# Patient Record
Sex: MALE | Race: ASIAN | NOT HISPANIC OR LATINO
[De-identification: names, ages, dates, MRNs, and addresses within clinical notes are randomized per-mention and may not be internally consistent; named-entity substitution may affect disease eponyms.]

---

## 2022-10-17 PROBLEM — Z00.00 ENCOUNTER FOR PREVENTIVE HEALTH EXAMINATION: Status: ACTIVE | Noted: 2022-10-17

## 2022-11-04 ENCOUNTER — APPOINTMENT (OUTPATIENT)
Dept: COLORECTAL SURGERY | Facility: CLINIC | Age: 47
End: 2022-11-04

## 2022-11-04 VITALS
SYSTOLIC BLOOD PRESSURE: 124 MMHG | HEART RATE: 91 BPM | BODY MASS INDEX: 19.69 KG/M2 | TEMPERATURE: 97.9 F | WEIGHT: 136 LBS | HEIGHT: 69.5 IN | DIASTOLIC BLOOD PRESSURE: 83 MMHG

## 2022-11-04 DIAGNOSIS — Z86.19 PERSONAL HISTORY OF OTHER INFECTIOUS AND PARASITIC DISEASES: ICD-10-CM

## 2022-11-04 DIAGNOSIS — Z78.9 OTHER SPECIFIED HEALTH STATUS: ICD-10-CM

## 2022-11-04 DIAGNOSIS — Z80.0 FAMILY HISTORY OF MALIGNANT NEOPLASM OF DIGESTIVE ORGANS: ICD-10-CM

## 2022-11-04 PROCEDURE — 99203 OFFICE O/P NEW LOW 30 MIN: CPT

## 2022-11-04 NOTE — PHYSICAL EXAM
[Fistula] : a fistula [Normal] : was normal [None] : there was no rectal mass  [de-identified] : Scarring along the right anterior anal margin with identified anal fistula opening

## 2022-11-04 NOTE — HISTORY OF PRESENT ILLNESS
[FreeTextEntry1] : 46 y/o M presents for evaluation of perianal abscess referred by Dr. Nadeem Saeed. \par \par PMH: Denies\par PSH: Denies\par FMH colorectal: Father colon Ca dx 91 y/o\par Last colonoscopy: 11/2/22, NL\par Denies ASA/NSAIDs in last 7 days.\par \par He was seen by GI Dr. Saeed 10/15/22 c/o of anal pain for the last year with chronic anal abscess incision and debridement of fistula August 2022. Was prescribed abx and lidocaine 3% external cream during visit. \par \par Today he reports that he noticed abscess in April when he was still living in [place], he had excised and drained in August, moved to [place] in October, established care with Dr. Saeed who sent him for MRI Pelvis (10/25/22)revealing transsphincteric perianal fistula from the 6'oclock position of the anus fistula, penetration of the right external sphincter with superior tract terminating bn the obturators internus muscle and prostate gland and inferior tract to the perineum, no focal ischioanal abscess collection. Per GI note 10/2022 he was started on abx, and lidocaine 3 %, however pt admits to self medicating with abx for 2 weeks. Symptoms: Felt pain at the area 1 year ago but did not address until  April of this year p/w worsening pain, bleeding, drainage, stool leaking through abscess. Denies fever, chills. He admits significant improvement in abscess, denies further drainage, bleeding, pain. \par \par BH: April constipation, straining. BM now 1-2 times daily, sits on toilet for extended period of time to avoid straining, soft and formed.\par \par Previous interventions: hot shower for pain relief. Denies any other interventions. \par \par Admits can improve intake of dietary fiber and water (2 bottles daily).\par Denies to use of stool softeners or fiber supplements\par \par \par \par \par \par \par \par

## 2022-11-04 NOTE — ASSESSMENT
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  Given the complexity of this fistula I recommend that we proceed with an exam under anesthesia with drainage/seton insertion.  Followed by 2 to 3 months of consolidation I suspect that he would be an excellent candidate in the future for an advancement flap repair.\par \par The risks and befits of the treatment plan were reviewed and outlined with the patient. The patient understands the associated risks of the involved treatment and wishes to proceed. All questions were answered and explained.\par \par

## 2022-11-16 ENCOUNTER — TRANSCRIPTION ENCOUNTER (OUTPATIENT)
Age: 47
End: 2022-11-16

## 2022-11-17 ENCOUNTER — RESULT REVIEW (OUTPATIENT)
Age: 47
End: 2022-11-17

## 2022-11-17 ENCOUNTER — TRANSCRIPTION ENCOUNTER (OUTPATIENT)
Age: 47
End: 2022-11-17

## 2022-11-17 ENCOUNTER — OUTPATIENT (OUTPATIENT)
Dept: OUTPATIENT SERVICES | Facility: HOSPITAL | Age: 47
LOS: 1 days | Discharge: HOME | End: 2022-11-17

## 2022-11-17 ENCOUNTER — APPOINTMENT (OUTPATIENT)
Dept: COLORECTAL SURGERY | Facility: HOSPITAL | Age: 47
End: 2022-11-17

## 2022-11-17 VITALS
HEART RATE: 68 BPM | SYSTOLIC BLOOD PRESSURE: 128 MMHG | HEIGHT: 68 IN | TEMPERATURE: 97 F | DIASTOLIC BLOOD PRESSURE: 86 MMHG | RESPIRATION RATE: 14 BRPM | OXYGEN SATURATION: 98 %

## 2022-11-17 VITALS
HEART RATE: 76 BPM | DIASTOLIC BLOOD PRESSURE: 93 MMHG | OXYGEN SATURATION: 98 % | TEMPERATURE: 98 F | RESPIRATION RATE: 16 BRPM | SYSTOLIC BLOOD PRESSURE: 139 MMHG

## 2022-11-17 PROCEDURE — 88304 TISSUE EXAM BY PATHOLOGIST: CPT | Mod: 26

## 2022-11-17 PROCEDURE — 45020 DRAINAGE OF RECTAL ABSCESS: CPT | Mod: GC

## 2022-11-17 RX ORDER — OXYCODONE HYDROCHLORIDE 5 MG/1
1 TABLET ORAL
Qty: 5 | Refills: 0
Start: 2022-11-17

## 2022-11-17 RX ORDER — ACETAMINOPHEN 500 MG
1000 TABLET ORAL ONCE
Refills: 0 | Status: DISCONTINUED | OUTPATIENT
Start: 2022-11-17 | End: 2022-11-17

## 2022-11-17 RX ORDER — SODIUM CHLORIDE 9 MG/ML
500 INJECTION, SOLUTION INTRAVENOUS
Refills: 0 | Status: DISCONTINUED | OUTPATIENT
Start: 2022-11-17 | End: 2022-11-17

## 2022-11-17 RX ORDER — OXYCODONE HYDROCHLORIDE 5 MG/1
5 TABLET ORAL ONCE
Refills: 0 | Status: DISCONTINUED | OUTPATIENT
Start: 2022-11-17 | End: 2022-11-17

## 2022-11-17 RX ORDER — ONDANSETRON 8 MG/1
4 TABLET, FILM COATED ORAL ONCE
Refills: 0 | Status: DISCONTINUED | OUTPATIENT
Start: 2022-11-17 | End: 2022-11-17

## 2022-11-17 NOTE — ASU PREOP CHECKLIST - SELECT TESTS ORDERED
COVID-19 You can access the FollowMyHealth Patient Portal offered by [facility] by registering at the following website: [email or web address]. By joining [facility]’s FollowMyHealth portal, you will also be able to view your health information using other applications (apps) compatible with our system.

## 2022-11-17 NOTE — ASU DISCHARGE PLAN (ADULT/PEDIATRIC) - CARE PROVIDER_API CALL
Gabriele Morrow)  ColonRectal Surgery; Surgery  [facility], [address]nd [place] [address]  Phone: [phone]  Fax: [phone]  Follow Up Time: 2 weeks

## 2022-11-17 NOTE — PROVIDER CONTACT NOTE (MEDICATION) - ACTION/TREATMENT ORDERED:
MD Pimentel stated he would send post op medications there. PT and pt's escort aware, and to call MD Office if any issues

## 2022-11-17 NOTE — PRE-ANESTHESIA EVALUATION ADULT - NSANTHOSAYNRD_GEN_A_CORE
No. SANDEEP screening performed.  STOP BANG Legend: 0-2 = LOW Risk; 3-4 = INTERMEDIATE Risk; 5-8 = HIGH Risk
No. SANDEEP screening performed.  STOP BANG Legend: 0-2 = LOW Risk; 3-4 = INTERMEDIATE Risk; 5-8 = HIGH Risk

## 2022-11-17 NOTE — ASU DISCHARGE PLAN (ADULT/PEDIATRIC) - NS MD DC FALL RISK RISK
For information on Fall & Injury Prevention, visit: [email or web address] OR  [email or web address] OR  https://www.cdc.gov/steadi/patient.html

## 2022-11-29 LAB — SURGICAL PATHOLOGY STUDY: SIGNIFICANT CHANGE UP

## 2022-12-19 ENCOUNTER — APPOINTMENT (OUTPATIENT)
Dept: COLORECTAL SURGERY | Facility: CLINIC | Age: 47
End: 2022-12-19

## 2022-12-19 VITALS
TEMPERATURE: 97.1 F | DIASTOLIC BLOOD PRESSURE: 85 MMHG | WEIGHT: 139 LBS | BODY MASS INDEX: 20.13 KG/M2 | HEART RATE: 116 BPM | SYSTOLIC BLOOD PRESSURE: 121 MMHG | HEIGHT: 69.5 IN

## 2022-12-19 PROCEDURE — 99214 OFFICE O/P EST MOD 30 MIN: CPT | Mod: 24

## 2022-12-19 NOTE — ASSESSMENT
[FreeTextEntry1] : I have discussed the patient that his complex anal fistula disease–Trans sphincteric would be best treated surgically.  Recommend return follow-up in 2 months to see Dr. Dunne for consideration of possible advancement flap repair.  Risk, benefits and alternatives outlined.  All questions answered.\par \par A chinese  was utilized for the entire visit . All questions were addressed.\par

## 2022-12-19 NOTE — PHYSICAL EXAM
[Fistula] : a fistula [Normal] : was normal [None] : there was no rectal mass  [de-identified] : Scarring along the right anterior anal margin with identified anal fistula opening

## 2022-12-19 NOTE — HISTORY OF PRESENT ILLNESS
[FreeTextEntry1] : 46 yo M presents for post op follow up of anal fistula, s/p \par Known to JAYSON Saeed\par Last colonoscopy 11/2/22 w/ Dr. Barrett Powell, internal non bleeding hemorrhoids noted, exam otherwise normal. No specimens collected\par \par h/o chronic anal abscess, incision and debridement of fistula August 2022 while living in [place]. Seen by JAYSON Saeed who treated w/ abx and lidocaine and referred for MRI pelvis (10/25/22) which noted transsphincteric perianal fistula from the 6 o'clock position of the anus fistula, penetration of the right external sphincter with superior tract terminating between the obturators internus muscle and prostate gland and inferior tract to the perineum, no focal ischioanal abscess collection.\par \par Last seen 11/4/22 for initial consultation, exam noted a fistula and scarring along the right anterior anal margin with identified anal fistula opening. The sphincter tone was normal. There was no rectal tenderness present. There was no rectal mass. \par \par s/p EUA, seton placement 11/17/22\par Surgical Pathology Report - Auth (Verified)\par \par Specimen(s) Submitted\par 1  Anal fistula tract\par \par Final Diagnosis\par Anal fistula tract:\par - Anal tissue with granulation tissue, acute and chronic inflammation\par and fibrosis\par \par Verified by: Jane Montanez MD\par \par

## 2022-12-19 NOTE — HISTORY OF PRESENT ILLNESS
[FreeTextEntry1] : 48 yo M presents for post op follow up of anal fistula, s/p \par Known to JAYSON Saeed\par Last colonoscopy 11/2/22 w/ Dr. Barrett Powell, internal non bleeding hemorrhoids noted, exam otherwise normal. No specimens collected\par \par h/o chronic anal abscess, incision and debridement of fistula August 2022 while living in [place]. Seen by JAYSON Saeed who treated w/ abx and lidocaine and referred for MRI pelvis (10/25/22) which noted transsphincteric perianal fistula from the 6 o'clock position of the anus fistula, penetration of the right external sphincter with superior tract terminating between the obturators internus muscle and prostate gland and inferior tract to the perineum, no focal ischioanal abscess collection.\par \par Last seen 11/4/22 for initial consultation, exam noted a fistula and scarring along the right anterior anal margin with identified anal fistula opening. The sphincter tone was normal. There was no rectal tenderness present. There was no rectal mass. \par \par s/p EUA, seton placement 11/17/22\par Surgical Pathology Report - Auth (Verified)\par \par Specimen(s) Submitted\par 1  Anal fistula tract\par \par Final Diagnosis\par Anal fistula tract:\par - Anal tissue with granulation tissue, acute and chronic inflammation\par and fibrosis\par \par Verified by: Jane Montanez MD\par \par

## 2022-12-19 NOTE — PHYSICAL EXAM
[Fistula] : a fistula [Normal] : was normal [None] : there was no rectal mass  [de-identified] : Scarring along the right anterior anal margin with identified anal fistula opening

## 2023-02-23 ENCOUNTER — APPOINTMENT (OUTPATIENT)
Dept: COLORECTAL SURGERY | Facility: CLINIC | Age: 48
End: 2023-02-23
Payer: MEDICAID

## 2023-02-23 VITALS
TEMPERATURE: 95 F | WEIGHT: 140 LBS | DIASTOLIC BLOOD PRESSURE: 91 MMHG | SYSTOLIC BLOOD PRESSURE: 129 MMHG | HEIGHT: 69.5 IN | HEART RATE: 76 BPM | BODY MASS INDEX: 20.27 KG/M2

## 2023-02-23 PROCEDURE — 46600 DIAGNOSTIC ANOSCOPY SPX: CPT

## 2023-02-23 NOTE — PHYSICAL EXAM
[FreeTextEntry1] : Medical assistant present for duration of physical examination\par \par General no acute distress, alert and oriented\par Psych responding appropriately to questions\par Nonlabored breathing\par Ambulating without assistance\par Skin normal color and pigment\par \par Anorectal Exam:\par Inspection scarring right anterior perianal skin about 3cm from anal verge, center of scar with 1cm area of induration and central punctum, no expressible discharge or drainage\par GENARO nontender, no masses palpated, no blood on gloved finger, no fluctuance\par \par Procedure: Anoscopy\par \par Pre procedure Diagnosis: anal fistula\par Post procedure Diagnosis: anal fistula\par Anesthesia: none\par Estimated blood loss: none\par Specimen: none\par Complications: none\par \par Consent obtained. Anoscopy was performed by passing a lighted anoscope with lubricant jelly into the anal canal and the entire anal mucosal surface was inspected. Findings included no fissure, moderate internal hemorrhoids, no visible mucosal changes or openings\par \par Patient tolerated examination and procedure well.\par \par \par

## 2023-02-23 NOTE — ASSESSMENT
[FreeTextEntry1] : Mandarin-speaking staff present for duration of patient encounter.\par Referred by Dr Morrow for complex anal fistula. H/o surgery 11/17/22 with Dr Morrow - KARINA, I&D, unable to cannulate internal opening of fistula per review of operative note. \par \par Recommend exam under anesthesia, possible seton placement.\par The nature of the procedure as well as risks and benefits were reviewed with the patient. Risk/benefits/alternatives discussed at length, including but not limited to pain, bleeding, infection, swelling, recurrence of symptoms, need for future surgery, scarring, and risk of alteration of bowel habits, which may be temporary or permanent.  The preoperative, intraoperative, and postoperative management were discussed with the patient in detail.  Informed consent was obtained. Ambulatory surgery instructions were given to patient.\par \par We discussed further recommendations or attempts at anal fistula repair would be pending assessment of fistula at time of EUA. We discussed multiple staged surgeries may be indicated to address a complex fistula.\par All questions were answered, patient expressed understanding, and would like to proceed with the proposed plan.\par

## 2023-02-23 NOTE — HISTORY OF PRESENT ILLNESS
[FreeTextEntry1] : 47 yo M presents for follow up complex transsphincteric fistula\par Previously seen by Dr. Morrow\par \par H/o chronic anal abscess I &D, with underlying fistula in 8/2022 while living in [place]. Later patient moved to [place] and established care w/ Gi Dr. Saeed 10/15/20, who sent pt for MRI of pelvis 10/25/22 c/w transsphincteric perianal fistula from 6 O'clock of the anus fistula penetration of the right external sphincter with superior tract terminating on the obturators internus muscle and prostate gland and inferior tract to the perineum, no focal ischioanal abscess collection.Per Gi note, pt prescribed abx and lidocaine 3% external cream during visit. \par \par Seen by Dr. Morrow for initial consultation 11/4/22, fistula and scarring along the right anterior anal margin with identified anal fistula opening, sphincter tone was normal. \par \par s/p EUA with I &D and drainage of perirectal abscess 11/17/22\par \par Surgical Pathology Report - Auth (11/29/22)\par Specimen(s) Submitted\par 1 Anal fistula tract\par Final Diagnosis\par Anal fistula tract:\par - Anal tissue with granulation tissue, acute and chronic inflammation\par and fibrosis\par Verified by: Jane Montanez MD\par \par Last seen by Dr. Morrow 12/19/22 for post op follow up. Per note, surgical procedure was c/w a complex transsphincteric fistula w/ an extensive fabiana horseshoe fistula tract. The internal opening was visualized, but unable to be cannulated through the external orifice w/ fistula probe given the circuitous and complex tract.\par Exam noted scarring along the right anterior anal margin with identified anal fistula opening. The sphincter tone was normal. There was no rectal tenderness present. There was no rectal mass. \par \par Recommended to return in 2 months to see Dr. Dunne for surgical evaluation\par \par Returns today c/o daily discharge, small amount on a pad.\par Reports an area of swelling, small, on and off every few days, for past few months. \par No fevers.\par BMs are daily, soft \par Denies BRBPR\par Denies pain\par Good appetite. \par \par Had colonoscopy 11/2/2022 - exam was otherwise normal to cecum, no specimens collection. \par \par Currently does not have any drains in place.

## 2023-03-23 ENCOUNTER — TRANSCRIPTION ENCOUNTER (OUTPATIENT)
Age: 48
End: 2023-03-23

## 2023-03-23 NOTE — ASU PATIENT PROFILE, ADULT - NS PREOP UNDERSTANDS INFO
NPO solids after midnight 3/22/23, stop clears after 4:30 am on DOS, bring insurance card and photo ID, Escort to bring photo ID, address and phone # reviewed with pt via Mandarin translater # [phone]./yes

## 2023-03-24 ENCOUNTER — TRANSCRIPTION ENCOUNTER (OUTPATIENT)
Age: 48
End: 2023-03-24

## 2023-03-24 ENCOUNTER — OUTPATIENT (OUTPATIENT)
Dept: OUTPATIENT SERVICES | Facility: HOSPITAL | Age: 48
LOS: 1 days | Discharge: HOME | End: 2023-03-24
Payer: MEDICAID

## 2023-03-24 ENCOUNTER — APPOINTMENT (OUTPATIENT)
Dept: COLORECTAL SURGERY | Facility: CLINIC | Age: 48
End: 2023-03-24

## 2023-03-24 VITALS
TEMPERATURE: 99 F | RESPIRATION RATE: 15 BRPM | HEART RATE: 85 BPM | SYSTOLIC BLOOD PRESSURE: 127 MMHG | OXYGEN SATURATION: 100 % | DIASTOLIC BLOOD PRESSURE: 85 MMHG

## 2023-03-24 VITALS
HEART RATE: 64 BPM | OXYGEN SATURATION: 98 % | HEIGHT: 68.5 IN | DIASTOLIC BLOOD PRESSURE: 88 MMHG | WEIGHT: 136.25 LBS | SYSTOLIC BLOOD PRESSURE: 127 MMHG | RESPIRATION RATE: 14 BRPM | TEMPERATURE: 97 F

## 2023-03-24 DIAGNOSIS — G89.18 OTHER ACUTE POSTPROCEDURAL PAIN: ICD-10-CM

## 2023-03-24 DIAGNOSIS — Z98.890 OTHER SPECIFIED POSTPROCEDURAL STATES: Chronic | ICD-10-CM

## 2023-03-24 PROCEDURE — 46020 PLACEMENT OF SETON: CPT | Mod: GC

## 2023-03-24 DEVICE — SURGICEL 4 X 8": Type: IMPLANTABLE DEVICE | Status: FUNCTIONAL

## 2023-03-24 RX ORDER — DOCUSATE SODIUM 100 MG
2 CAPSULE ORAL
Qty: 0 | Refills: 0 | DISCHARGE

## 2023-03-24 RX ORDER — OXYCODONE AND ACETAMINOPHEN 5; 325 MG/1; MG/1
1 TABLET ORAL EVERY 4 HOURS
Refills: 0 | Status: DISCONTINUED | OUTPATIENT
Start: 2023-03-24 | End: 2023-03-24

## 2023-03-24 RX ORDER — OXYCODONE AND ACETAMINOPHEN 5; 325 MG/1; MG/1
2 TABLET ORAL EVERY 6 HOURS
Refills: 0 | Status: DISCONTINUED | OUTPATIENT
Start: 2023-03-24 | End: 2023-03-24

## 2023-03-24 RX ORDER — OXYCODONE AND ACETAMINOPHEN 5; 325 MG/1; MG/1
5-325 TABLET ORAL
Qty: 12 | Refills: 0 | Status: ACTIVE | COMMUNITY
Start: 2023-03-24 | End: 1900-01-01

## 2023-03-24 RX ORDER — DOCUSATE SODIUM 100 MG/1
100 CAPSULE ORAL 3 TIMES DAILY
Qty: 90 | Refills: 1 | Status: ACTIVE | COMMUNITY
Start: 2023-03-24 | End: 1900-01-01

## 2023-03-24 RX ORDER — ONDANSETRON 8 MG/1
4 TABLET, FILM COATED ORAL ONCE
Refills: 0 | Status: DISCONTINUED | OUTPATIENT
Start: 2023-03-24 | End: 2023-03-24

## 2023-03-24 RX ORDER — ACETAMINOPHEN 500 MG
650 TABLET ORAL ONCE
Refills: 0 | Status: DISCONTINUED | OUTPATIENT
Start: 2023-03-24 | End: 2023-03-24

## 2023-03-24 NOTE — ASU DISCHARGE PLAN (ADULT/PEDIATRIC) - CARE PROVIDER_API CALL
Miryam Dunne)  ColonRectal Surgery; Surgery  [address] [facility], 2nd Floor  [place], [place] [address]  Phone: [phone]  Fax: [phone]  Follow Up Time:

## 2023-03-24 NOTE — ASU DISCHARGE PLAN (ADULT/PEDIATRIC) - NS MD DC FALL RISK RISK
For information on Fall & Injury Prevention, visit: [email or web address] OR  [email or web address] OR  https://www.cdc.gov/steadi/patient.html

## 2023-04-26 PROBLEM — K60.3 ANAL FISTULA: Chronic | Status: ACTIVE | Noted: 2023-03-23

## 2023-05-04 ENCOUNTER — APPOINTMENT (OUTPATIENT)
Dept: COLORECTAL SURGERY | Facility: CLINIC | Age: 48
End: 2023-05-04
Payer: MEDICAID

## 2023-05-04 VITALS
SYSTOLIC BLOOD PRESSURE: 112 MMHG | TEMPERATURE: 97.3 F | WEIGHT: 144 LBS | BODY MASS INDEX: 20.85 KG/M2 | DIASTOLIC BLOOD PRESSURE: 80 MMHG | HEART RATE: 73 BPM | HEIGHT: 69.5 IN

## 2023-05-04 PROCEDURE — 99214 OFFICE O/P EST MOD 30 MIN: CPT

## 2023-05-04 NOTE — PHYSICAL EXAM
Recommended observation. [FreeTextEntry1] : Medical assistant present for duration of physical examination\par \par General no acute distress, alert and oriented\par Psych responding appropriately to questions\par Nonlabored breathing\par Ambulating without assistance\par Skin normal color and pigment\par \par Anorectal Exam:\par Inspection scarring right anterior perianal skin about 3cm from anal verge, center of scar contains seton extending towards posterior anal canal, scant expressible discharge from external opening\par \par Patient tolerated examination well.\par \par \par

## 2023-05-04 NOTE — HISTORY OF PRESENT ILLNESS
[FreeTextEntry1] : 49 y/o M presents for post operative evaluation of complex transsphincteric fistula \par \par H/o chronic anal abscess I &D, with underlying fistula in 8/2022 while living in [place]. Established care w/GI Dr. Saeed (10/15/20) MRI obtained 10/25/20 c/w transsphincteric perianal fistula from 6 O'clock of the anus fistula penetration of the right external sphincter with superior tract terminating on the obturators internus muscle and prostate gland and inferior tract to the perineum, no focal ischioanal abscess collection.Per Gi note, pt prescribed abx and lidocaine 3% external cream during visit. \par \par Previously seen by CRS Dr. Morrow, for initial consultation 11/4/22. \par \par S/p EUA with I & D and drainage of perirectal abscess 11/17/22. 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 The internal opening was visualized, but unable to be cannulated thought the external orifice w/ fistula probe given the circuitous and complex tract. \par \par Patient advised to our office for surgical evaluation. Seen 2/23/23, scarring right anterior perianal skin about 3 cm from anal verge, center of scar with 1 cm area of induration and central punctum, no expressible discharge or drainage. Moderate internal hemorrhoids appreciated on anoscopy. \par \par Patient now s/p EUA and seton placement on 3/24/2023\par \par Doing well since procedure.\par Still having drainage, but less than prior. On and off. \par No pain. \par No fevers.\par \par BMs are once daily. Used stool softeners after surgery. \par \par Stiz baths 1-2x per day.

## 2023-05-04 NOTE — ASSESSMENT
[FreeTextEntry1] : Mandarin-speaking staff present for duration of patient encounter.\par Complex anal fistula, s/p seton placement. Possible role for multiple stage procedures discussed to repair fistula.\par Recommend patient consider EUA, subcutaneous fistulotomy, possible seton, possible ligation of fistula tract (LIFT) procedure.\par The nature of the procedure as well as risks and benefits were reviewed with the patient. Risks discussed included but were not limited to pain, bleeding, infection, recurrence of symptoms, need for future surgery, and risk of alteration of bowel habits, such as seepage, fecal urgency and/or fecal (gas, liquid or solid) incontinence discussed, which may be temporary or permanent. \par Failure rates of intervention discussed.\par The preoperative, intraoperative, and postoperative management were discussed with the patient in detail. All questions were answered. The patient is in agreement with the proposed surgery and we will proceed with surgical planning.\par

## 2023-05-25 ENCOUNTER — TRANSCRIPTION ENCOUNTER (OUTPATIENT)
Age: 48
End: 2023-05-25

## 2023-05-25 RX ORDER — ACETAMINOPHEN 500 MG
1000 TABLET ORAL ONCE
Refills: 0 | Status: DISCONTINUED | OUTPATIENT
Start: 2023-05-26 | End: 2023-05-26

## 2023-05-25 RX ORDER — SODIUM CHLORIDE 9 MG/ML
1000 INJECTION, SOLUTION INTRAVENOUS
Refills: 0 | Status: DISCONTINUED | OUTPATIENT
Start: 2023-05-26 | End: 2023-05-26

## 2023-05-25 RX ORDER — FENTANYL CITRATE 50 UG/ML
25 INJECTION INTRAVENOUS
Refills: 0 | Status: DISCONTINUED | OUTPATIENT
Start: 2023-05-26 | End: 2023-05-26

## 2023-05-25 RX ORDER — ONDANSETRON 8 MG/1
8 TABLET, FILM COATED ORAL ONCE
Refills: 0 | Status: DISCONTINUED | OUTPATIENT
Start: 2023-05-26 | End: 2023-05-26

## 2023-05-25 NOTE — ASU PATIENT PROFILE, ADULT - NS PREOP UNDERSTANDS INFO
As per MD no solid food after 7:00pm tonight, water allowed before midnight; patient to bring photo ID/Insurance card; dress in comfortable clothes; no jewelries/contact lens/valuables; no smoking/alcohol/recreational drug use today; escort to come with photo ID; address and callback number was given/yes

## 2023-05-25 NOTE — PRE-ANESTHESIA EVALUATION ADULT - NSANTHOSAYNRD_GEN_A_CORE
No. SANDEEP screening performed.  STOP BANG Legend: 0-2 = LOW Risk; 3-4 = INTERMEDIATE Risk; 5-8 = HIGH Risk

## 2023-05-25 NOTE — ASU PATIENT PROFILE, ADULT - INTERNATIONAL TRAVEL
Patient is currently on losartan 25 mg daily and metoprolol succinate 50 mg daily. He states he measured his BP last months and reported SBP under 130s and DBP under 80.   His initial BP on arrival was 146/89 which improved to 141/83. The patient describes himself as nervous when his BP is taken.  Our recommendation is to continue current management but instructed the patient to closely monitor BP (measure 2 to 3 times/day) and lead a BP log. He was instructed to send the log to us after a few weeks so we can evaluate and adjust his blood pressure medications if necessary.  
The patient is overall doing well and has no active issues or concerns.  We recommended AAA ultrasound screening due to his significant smoking history. He agreed. We will notify him of the results.  Immunizations, including COVID-19 up to date.  
No

## 2023-05-26 ENCOUNTER — OUTPATIENT (OUTPATIENT)
Dept: OUTPATIENT SERVICES | Facility: HOSPITAL | Age: 48
LOS: 1 days | Discharge: HOME | End: 2023-05-26
Payer: MEDICAID

## 2023-05-26 ENCOUNTER — TRANSCRIPTION ENCOUNTER (OUTPATIENT)
Age: 48
End: 2023-05-26

## 2023-05-26 ENCOUNTER — RESULT REVIEW (OUTPATIENT)
Age: 48
End: 2023-05-26

## 2023-05-26 ENCOUNTER — APPOINTMENT (OUTPATIENT)
Dept: COLORECTAL SURGERY | Facility: CLINIC | Age: 48
End: 2023-05-26

## 2023-05-26 VITALS
SYSTOLIC BLOOD PRESSURE: 137 MMHG | HEART RATE: 96 BPM | DIASTOLIC BLOOD PRESSURE: 83 MMHG | OXYGEN SATURATION: 100 % | RESPIRATION RATE: 16 BRPM | TEMPERATURE: 98 F

## 2023-05-26 VITALS
OXYGEN SATURATION: 97 % | DIASTOLIC BLOOD PRESSURE: 92 MMHG | TEMPERATURE: 98 F | HEIGHT: 68 IN | RESPIRATION RATE: 14 BRPM | HEART RATE: 85 BPM | SYSTOLIC BLOOD PRESSURE: 126 MMHG | WEIGHT: 138.89 LBS

## 2023-05-26 DIAGNOSIS — Z98.890 OTHER SPECIFIED POSTPROCEDURAL STATES: Chronic | ICD-10-CM

## 2023-05-26 PROCEDURE — 88304 TISSUE EXAM BY PATHOLOGIST: CPT | Mod: 26

## 2023-05-26 PROCEDURE — 46275 REMOVE ANAL FIST INTER: CPT | Mod: GC

## 2023-05-26 RX ORDER — DOCUSATE SODIUM 100 MG/1
100 CAPSULE ORAL 3 TIMES DAILY
Qty: 90 | Refills: 1 | Status: ACTIVE | COMMUNITY
Start: 2023-05-26 | End: 1900-01-01

## 2023-05-26 RX ORDER — AMOXICILLIN AND CLAVULANATE POTASSIUM 875; 125 MG/1; MG/1
875-125 TABLET, COATED ORAL
Qty: 14 | Refills: 0 | Status: ACTIVE | COMMUNITY
Start: 2023-05-26 | End: 1900-01-01

## 2023-05-26 RX ORDER — OXYCODONE AND ACETAMINOPHEN 5; 325 MG/1; MG/1
5-325 TABLET ORAL
Qty: 18 | Refills: 0 | Status: ACTIVE | COMMUNITY
Start: 2023-05-26 | End: 1900-01-01

## 2023-05-26 RX ADMIN — SODIUM CHLORIDE 100 MILLILITER(S): 9 INJECTION, SOLUTION INTRAVENOUS at 10:09

## 2023-05-26 RX ADMIN — SODIUM CHLORIDE 100 MILLILITER(S): 9 INJECTION, SOLUTION INTRAVENOUS at 11:15

## 2023-05-26 NOTE — ASU DISCHARGE PLAN (ADULT/PEDIATRIC) - ASU DC SPECIAL INSTRUCTIONSFT
[id number] Please follow up with Dr. Dunne In one month. Call her office to schedule an appointment: [phone].     Sitz bathes are to help relieve discomfort in your perineum. The perineum is the area between the vagina and rectum on a female and the area between the scrotum and  rectum on a male. The sitz bath also helps in cleansing and promotes healing.     You have been prescribed Colace, Percocet, and Augmentin please take as prescribed. Please complete your full course of antibiotics.    General Discharge Instructions:  Please resume all regular home medications unless specifically advised not to take a particular medication. Also, please take any new medications as prescribed.  Please get plenty of rest, continue to ambulate several times per day, and drink adequate amounts of fluids. Avoid lifting weights greater than 5-10 lbs until you follow-up with your surgeon, who will instruct you further regarding activity restrictions.  Avoid driving or operating heavy machinery while taking pain medications.  Please follow-up with your surgeon and Primary Care Provider (PCP) as advised.  Incision Care:  *Please call your doctor or nurse practitioner if you have increased pain, swelling, redness, or drainage from the incision site.  *Avoid swimming and baths until your follow-up appointment.  *You may shower, and wash surgical incisions with a mild soap and warm water. Gently pat the area dry.    Warning Signs:  Please call your doctor or nurse practitioner if you experience the following:  *You experience new chest pain, pressure, squeezing or tightness.  *New or worsening cough, shortness of breath, or wheeze.  *If you are vomiting and cannot keep down fluids or your medications.  *You are getting dehydrated due to continued vomiting, diarrhea, or other reasons. Signs of dehydration include dry mouth, rapid heartbeat, or feeling dizzy or faint when standing.  *You see blood or dark/black material when you vomit or have a bowel movement.  *You experience burning when you urinate, have blood in your urine, or experience a discharge.  *Your pain is not improving within 8-12 hours or is not gone within 24 hours. Call or return immediately if your pain is getting worse, changes location, or moves to your chest or back.  *You have shaking chills, or fever greater than 101.5 degrees Fahrenheit or 38 degrees Celsius.  *Any change in your symptoms, or any new symptoms that concern you.

## 2023-05-26 NOTE — BRIEF OPERATIVE NOTE - OPERATION/FINDINGS
Patient placed in prone jorge knife position. Local anal block and transsphincteric block performed.  GENARO performed with notable chronic stricture. Prior seton followed and fistula unroofed using electrocautery. Prior external orifice debrided and specimens collected. Seton removed and tract probed from external to internal orifice. Fistula tract ligated with 16x UR6 and 4x 3-0 vicryl sutures. Hydrogen peroxide injected without notable continued leak from the internal opening. Anoderm reapproximated with vicryl 3-0 x2. Surgical site copiously irrigated and hemostasis confirmed. Patient placed in prone jorge knife position. Perianal block performed.  GENARO performed with notable chronic scarring. Prior seton followed and subcutaneous portion of fistula unroofed using electrocautery. Prior external orifice curetted and specimens collected. Seton removed and tract probed from external to internal orifice. Fistula tract ligated and closed using 2-0 vicryl sutures. Hydrogen peroxide injected without notable passage through the closed internal opening. Anoderm reapproximated with vicryl 3-0 x2. Surgical site copiously irrigated and hemostasis confirmed.

## 2023-05-26 NOTE — ASU DISCHARGE PLAN (ADULT/PEDIATRIC) - CARE PROVIDER_API CALL
Miryam Dunne)  ColonRectal Surgery; Surgery  [address] [facility], 2nd Floor  [place], [place] [address]  Phone: [phone]  Fax: [phone]  Follow Up Time: 1 month

## 2023-05-26 NOTE — ASU DISCHARGE PLAN (ADULT/PEDIATRIC) - NS MD DC FALL RISK RISK
For information on Fall & Injury Prevention, visit: [email or web address] OR  [email or web address] OR  https://www.cdc.gov/steadi/patient.html

## 2023-06-07 LAB — SURGICAL PATHOLOGY STUDY: SIGNIFICANT CHANGE UP

## 2023-06-22 ENCOUNTER — APPOINTMENT (OUTPATIENT)
Dept: COLORECTAL SURGERY | Facility: CLINIC | Age: 48
End: 2023-06-22
Payer: MEDICAID

## 2023-06-22 VITALS
TEMPERATURE: 97.9 F | DIASTOLIC BLOOD PRESSURE: 90 MMHG | SYSTOLIC BLOOD PRESSURE: 131 MMHG | BODY MASS INDEX: 20.27 KG/M2 | WEIGHT: 140 LBS | HEART RATE: 66 BPM | HEIGHT: 69.5 IN

## 2023-06-22 PROCEDURE — 99024 POSTOP FOLLOW-UP VISIT: CPT

## 2023-06-22 NOTE — PHYSICAL EXAM
[FreeTextEntry1] : Medical assistant present for duration of physical examination\par \par General no acute distress, alert and oriented\par Psych responding appropriately to questions, in good spirits\par Nonlabored breathing\par Ambulating without assistance\par Skin normal color and pigment\par \par Anorectal Exam:\par Inspection well healing wound right perianal region, no fluctuance or drainage, no cellulitis\par GENARO nontender, no flutuance\par \par Patient tolerated examination well.\par \par \par

## 2023-06-22 NOTE — ASSESSMENT
[FreeTextEntry1] : Recovering well, patient reassured\par Continue high fiber diet, adequate oral hydration, and sitz baths.\par Avoid constipation and straining\par F/u in 1 month or sooner as needed\par All questions were answered, patient expressed understanding, and is agreeable to this plan.\par Mandarin-speaking staff present for duration of patient encounter.\par \par

## 2023-06-22 NOTE — HISTORY OF PRESENT ILLNESS
[FreeTextEntry1] : 49 yo Mandarin speaking M presents in second post operative evaluation of complex transsphincteric fistula\par \par H/o chronic anal abscess I &D, with underlying fistula in 8/2022 while living in [place]. Established care w/GI Dr. Saeed (10/15/20) MRI obtained 10/25/20 c/w transsphincteric perianal fistula from 6 O'clock of the anus fistula penetration of the right external sphincter with superior tract terminating on the obturators internus muscle and prostate gland and inferior tract to the perineum, no focal ischioanal abscess collection.Per Gi note, pt prescribed abx and lidocaine 3% external cream during visit. \par \par Previously seen by CRS Dr. Morrow, for initial consultation 11/4/22. \par \par S/p EUA with I & D and drainage of perirectal abscess 11/17/22. 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 The internal opening was visualized, but unable to be cannulated thought the external orifice w/ fistula probe given the circuitous and complex tract. \par \par Patient advised to our office for surgical evaluation. Seen 2/23/23, scarring right anterior perianal skin about 3 cm from anal verge, center of scar with 1 cm area of induration and central punctum, no expressible discharge or drainage. Moderate internal hemorrhoids appreciated on anoscopy. \par \par S/p EUA and seton placement on 3/24/2023, Now s/p EUA with subcutaneous fistulotomy and repair of anal fistula with LIFT 5/26/23\par \par Collected Date/Time: 5/26/2023 08:40 EDT\par Surgical Pathology Report - Auth (Verified)\par \par Specimen(s) Submitted\par 1  Anus fistula\par \par Final Diagnosis\par 1.  Anal fistula:\par -   Granulation tissue with marked acute and chronic inflammation and edema\par Verified by: Jane Montanez MD\par \par Pt doing well, admits to pain during initial week of recovery that resolved. As of two days ago, he felt slight needle like sensation when sitting or walking, denies at present.\par \par h/o wetness/discharge during recovery which stopped two days ago. He continues to wear gauze\par \par Moving bowels regularly, denies bleeding, fecal streaking or incontinence\par Takes daily shower and takes sitz bath twice a day\par \par Denies ASA/NSAID use

## 2023-07-20 ENCOUNTER — APPOINTMENT (OUTPATIENT)
Dept: COLORECTAL SURGERY | Facility: CLINIC | Age: 48
End: 2023-07-20

## 2023-07-27 ENCOUNTER — APPOINTMENT (OUTPATIENT)
Dept: COLORECTAL SURGERY | Facility: CLINIC | Age: 48
End: 2023-07-27
Payer: MEDICAID

## 2023-07-27 VITALS
WEIGHT: 142 LBS | HEART RATE: 86 BPM | DIASTOLIC BLOOD PRESSURE: 75 MMHG | SYSTOLIC BLOOD PRESSURE: 118 MMHG | HEIGHT: 69.5 IN | BODY MASS INDEX: 20.56 KG/M2 | TEMPERATURE: 97.9 F

## 2023-07-27 DIAGNOSIS — K60.3 ANAL FISTULA: ICD-10-CM

## 2023-07-27 PROCEDURE — 99024 POSTOP FOLLOW-UP VISIT: CPT

## 2023-07-27 NOTE — ASSESSMENT
[FreeTextEntry1] : Mandarin-speaking staff present for duration of patient encounter.\par Continue stool softeners as needed.\par Discussed topical barrier agents to skin as need for irritation. \par F/u as needed. \par All questions were answered, patient expressed understanding, and is agreeable to this plan.\par

## 2023-07-27 NOTE — HISTORY OF PRESENT ILLNESS
[FreeTextEntry1] : 47 yo Mandarin speaking M presents for f/u \par \par H/o complex transsphincteric fistula, S/p EUA and seton placement on 3/24/2023, Now s/p EUA with subcutaneous fistulotomy and repair of anal fistula with LIFT 5/26/23\par \par H/o chronic anal abscess I &D, with underlying fistula in 8/2022 while living in [place]. Established care w/GI Dr. Saeed (10/15/20) MRI obtained 10/25/20 c/w transsphincteric perianal fistula from 6 O'clock of the anus fistula penetration of the right external sphincter with superior tract terminating on the obturators internus muscle and prostate gland and inferior tract to the perineum, no focal ischioanal abscess collection.Per Gi note, pt prescribed abx and lidocaine 3% external cream during visit. \par \par Previously seen by CRS Dr. Morrow, for initial consultation 11/4/22. \par \par S/p EUA with I & D and drainage of perirectal abscess 11/17/22. 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 The internal opening was visualized, but unable to be cannulated thought the external orifice w/ fistula probe given the circuitous and complex tract. \par \par Patient advised to our office for surgical evaluation. Seen 2/23/23, scarring right anterior perianal skin about 3 cm from anal verge, center of scar with 1 cm area of induration and central punctum, no expressible discharge or drainage. Moderate internal hemorrhoids appreciated on anoscopy. \par \par S/p EUA and seton placement on 3/24/2023, Now s/p EUA with subcutaneous fistulotomy and repair of anal fistula with LIFT 5/26/23\par \par Collected Date/Time: 5/26/2023 08:40 EDT\par Surgical Pathology Report - Auth (Verified)\par \par Specimen(s) Submitted\par 1 Anus fistula\par \par Final Diagnosis\par 1. Anal fistula:\par - Granulation tissue with marked acute and chronic inflammation and edema\par Verified by: Jane Montanez MD\par \par Most recently seen in second post operative evaluation 6/22/23, well healing wound right perianal region, no fluctuance or drainage, no cellulitis. \par \par Feels well.\par If walks for a long period of time 2-3hrs, feels discomfort. Denies pain or discharge. Otherwise feels well.\par No fevers.\par No BRBPR.\par BM daily, formed, occasional stool softener as needed.\par \par

## 2023-07-27 NOTE — PHYSICAL EXAM
[FreeTextEntry1] : Medical assistant present for duration of physical examination\par \par General no acute distress, alert and oriented\par Psych responding appropriately to questions, in good spirits\par Nonlabored breathing\par Ambulating without assistance\par Skin normal color and pigment\par \par Anorectal Exam:\par Inspection well healed scar right perianal region, no fluctuance or drainage, no cellulitis\par GENARO nontender, no fluctuance, able to accommodate gloved index finger\par \par Procedure: Anoscopy\par \par Pre procedure Diagnosis: h/o anal fistula repair\par Post procedure Diagnosis: h/o anal fistula repair\par Anesthesia: none\par Estimated blood loss: none\par Specimen: none\par Complications: none\par \par Consent obtained. Anoscopy was performed by passing a lighted anoscope with lubricant jelly into the anal canal and the entire anal mucosal surface was inspected. Findings included no fissure, mild internal hemorrhoids, no discharge or drainage in anal canal\par \par Patient tolerated examination and procedure well.\par \par \par \par

## 2024-12-21 NOTE — BRIEF OPERATIVE NOTE - NSICDXBRIEFPROCEDURE_GEN_ALL_CORE_FT
details… no joint erythema/no joint warmth/no calf tenderness PROCEDURES:  Anal fistulotomy with ligation of fistula tract 26-May-2023 09:54:03  Jamar Pan

## 2025-04-03 ENCOUNTER — APPOINTMENT (OUTPATIENT)
Dept: COLORECTAL SURGERY | Facility: CLINIC | Age: 50
End: 2025-04-03
Payer: COMMERCIAL

## 2025-04-03 ENCOUNTER — NON-APPOINTMENT (OUTPATIENT)
Age: 50
End: 2025-04-03

## 2025-04-03 VITALS
TEMPERATURE: 97.2 F | HEIGHT: 69.5 IN | BODY MASS INDEX: 21.28 KG/M2 | DIASTOLIC BLOOD PRESSURE: 92 MMHG | SYSTOLIC BLOOD PRESSURE: 136 MMHG | WEIGHT: 147 LBS | HEART RATE: 111 BPM

## 2025-04-03 DIAGNOSIS — K61.0 ANAL ABSCESS: ICD-10-CM

## 2025-04-03 PROCEDURE — 46050 I&D PERIANAL ABSCESS SUPFC: CPT

## 2025-04-03 PROCEDURE — 99213 OFFICE O/P EST LOW 20 MIN: CPT | Mod: 25

## 2025-04-19 ENCOUNTER — OUTPATIENT (OUTPATIENT)
Dept: OUTPATIENT SERVICES | Facility: HOSPITAL | Age: 50
LOS: 1 days | End: 2025-04-19

## 2025-04-19 ENCOUNTER — APPOINTMENT (OUTPATIENT)
Dept: MRI IMAGING | Facility: CLINIC | Age: 50
End: 2025-04-19
Payer: COMMERCIAL

## 2025-04-19 DIAGNOSIS — Z98.890 OTHER SPECIFIED POSTPROCEDURAL STATES: Chronic | ICD-10-CM

## 2025-04-19 PROCEDURE — 72197 MRI PELVIS W/O & W/DYE: CPT | Mod: 26

## 2025-05-01 ENCOUNTER — NON-APPOINTMENT (OUTPATIENT)
Age: 50
End: 2025-05-01

## 2025-05-15 ENCOUNTER — APPOINTMENT (OUTPATIENT)
Dept: COLORECTAL SURGERY | Facility: CLINIC | Age: 50
End: 2025-05-15
Payer: COMMERCIAL

## 2025-05-15 VITALS
SYSTOLIC BLOOD PRESSURE: 137 MMHG | HEIGHT: 69.5 IN | BODY MASS INDEX: 20.27 KG/M2 | TEMPERATURE: 97.7 F | HEART RATE: 71 BPM | DIASTOLIC BLOOD PRESSURE: 86 MMHG | WEIGHT: 140 LBS

## 2025-05-15 DIAGNOSIS — K60.30 ANAL FISTULA, UNSPECIFIED: ICD-10-CM

## 2025-05-15 PROCEDURE — 99214 OFFICE O/P EST MOD 30 MIN: CPT

## 2025-05-19 ENCOUNTER — NON-APPOINTMENT (OUTPATIENT)
Age: 50
End: 2025-05-19

## 2025-05-19 LAB
ALBUMIN SERPL ELPH-MCNC: 4.9 G/DL
ALP BLD-CCNC: 103 U/L
ALT SERPL-CCNC: 29 U/L
ANION GAP SERPL CALC-SCNC: 14 MMOL/L
AST SERPL-CCNC: 28 U/L
BILIRUB SERPL-MCNC: 0.5 MG/DL
BUN SERPL-MCNC: 13 MG/DL
CALCIUM SERPL-MCNC: 9.7 MG/DL
CHLORIDE SERPL-SCNC: 102 MMOL/L
CO2 SERPL-SCNC: 24 MMOL/L
CREAT SERPL-MCNC: 0.9 MG/DL
EGFRCR SERPLBLD CKD-EPI 2021: 103 ML/MIN/1.73M2
GLUCOSE SERPL-MCNC: 105 MG/DL
HCT VFR BLD CALC: 43.9 %
HGB BLD-MCNC: 14.8 G/DL
MCHC RBC-ENTMCNC: 30.8 PG
MCHC RBC-ENTMCNC: 33.7 G/DL
MCV RBC AUTO: 91.5 FL
PLATELET # BLD AUTO: 242 K/UL
POTASSIUM SERPL-SCNC: 4.5 MMOL/L
PROT SERPL-MCNC: 7.7 G/DL
RBC # BLD: 4.8 M/UL
RBC # FLD: 12.1 %
SODIUM SERPL-SCNC: 139 MMOL/L
WBC # FLD AUTO: 6.32 K/UL

## 2025-06-06 ENCOUNTER — APPOINTMENT (OUTPATIENT)
Dept: COLORECTAL SURGERY | Facility: CLINIC | Age: 50
End: 2025-06-06

## 2025-06-06 ENCOUNTER — OUTPATIENT (OUTPATIENT)
Dept: OUTPATIENT SERVICES | Facility: HOSPITAL | Age: 50
LOS: 1 days | Discharge: HOME | End: 2025-06-06
Payer: COMMERCIAL

## 2025-06-06 ENCOUNTER — TRANSCRIPTION ENCOUNTER (OUTPATIENT)
Age: 50
End: 2025-06-06

## 2025-06-06 VITALS
TEMPERATURE: 98 F | DIASTOLIC BLOOD PRESSURE: 68 MMHG | RESPIRATION RATE: 14 BRPM | OXYGEN SATURATION: 99 % | HEART RATE: 64 BPM | SYSTOLIC BLOOD PRESSURE: 115 MMHG

## 2025-06-06 VITALS
WEIGHT: 144.62 LBS | DIASTOLIC BLOOD PRESSURE: 93 MMHG | SYSTOLIC BLOOD PRESSURE: 143 MMHG | OXYGEN SATURATION: 98 % | TEMPERATURE: 98 F | HEART RATE: 68 BPM | RESPIRATION RATE: 17 BRPM | HEIGHT: 68 IN

## 2025-06-06 DIAGNOSIS — Z98.890 OTHER SPECIFIED POSTPROCEDURAL STATES: Chronic | ICD-10-CM

## 2025-06-06 PROCEDURE — 46280 REMOVE ANAL FIST COMPLEX: CPT | Mod: GC

## 2025-06-06 DEVICE — SURGICEL 0.5 X 2": Type: IMPLANTABLE DEVICE | Status: FUNCTIONAL

## 2025-06-06 RX ORDER — FENTANYL CITRATE-0.9 % NACL/PF 100MCG/2ML
25 SYRINGE (ML) INTRAVENOUS
Refills: 0 | Status: DISCONTINUED | OUTPATIENT
Start: 2025-06-06 | End: 2025-06-06

## 2025-06-06 RX ORDER — OXYCODONE AND ACETAMINOPHEN 5; 325 MG/1; MG/1
5-325 TABLET ORAL
Qty: 18 | Refills: 0 | Status: ACTIVE | COMMUNITY
Start: 2025-06-06 | End: 1900-01-01

## 2025-06-06 RX ORDER — ONDANSETRON HCL/PF 4 MG/2 ML
4 VIAL (ML) INJECTION ONCE
Refills: 0 | Status: DISCONTINUED | OUTPATIENT
Start: 2025-06-06 | End: 2025-06-06

## 2025-06-06 RX ORDER — SODIUM CHLORIDE 9 G/1000ML
500 INJECTION, SOLUTION INTRAVENOUS
Refills: 0 | Status: DISCONTINUED | OUTPATIENT
Start: 2025-06-06 | End: 2025-06-06

## 2025-06-06 RX ORDER — ACETAMINOPHEN 500 MG/5ML
1000 LIQUID (ML) ORAL ONCE
Refills: 0 | Status: DISCONTINUED | OUTPATIENT
Start: 2025-06-06 | End: 2025-06-06

## 2025-06-06 RX ORDER — OXYCODONE HYDROCHLORIDE 30 MG/1
5 TABLET ORAL ONCE
Refills: 0 | Status: DISCONTINUED | OUTPATIENT
Start: 2025-06-06 | End: 2025-06-06

## 2025-06-06 NOTE — ASU PATIENT PROFILE, ADULT - PROVIDER NOTIFICATION
May 15, 2024      [facility]  [address]  [address]  [place] [address]  Phone: [phone]       Patient: Roly Candelaria   YOB: 1989  Date of Visit: 05/15/2024    To Whom It May Concern:    Ludivina Candelaria  was at [facility] on 05/15/2024. The patient may return to work/school on 05/15/2024 with no restrictions. If you have any questions or concerns, or if I can be of further assistance, please do not hesitate to contact me.    Sincerely,    Trista Hart MA     
Declines

## 2025-06-06 NOTE — ASU DISCHARGE PLAN (ADULT/PEDIATRIC) - CARE COORDINATION DISCHARGE PLANNING
1. Have you been to the ER, urgent care clinic since your last visit? Hospitalized since your last visit? No    2. Have you seen or consulted any other health care providers outside of the [address] [place] [address] since your last visit? Include any pap smears or colon screening. No    Chief Complaint   Patient presents with    Medication Refill     Health Maintenance Due   Topic Date Due    COVID-19 Vaccine (1) Never done    HIV screen  Never done    Shingles vaccine (1 of 2) Never done    A1C test (Diabetic or Prediabetic)  11/11/2022    Lipids  06/23/2023     PHQ-9 Total Score: 0 (6/9/2023  8:42 AM)    AMB Abuse Screening 6/9/2023   Do you ever feel afraid of your partner? N   Are you in a relationship with someone who physically or mentally threatens you? N   Is it safe for you to go home?  Y     Patient-Reported Vitals 6/9/2023   Patient-Reported Weight 240lb   Patient-Reported Height 5'11 No

## 2025-06-06 NOTE — BRIEF OPERATIVE NOTE - OPERATION/FINDINGS
See operative report for full dictation.    Patient was placed in prone jackknife position, perineum was prepped and draped in usual sterile fashion. Juliet-anal block was performed. External visual examination revealed a small punctate wound approximately 2cm from the anal verge in the anterolateral position. Anoscopy was performed with a Adriana retractor that showed an internal opening in the posteromedial distal anal canal. The internal opening was not easily visualized, therefore a mixture of hydrogen peroxide and saline was injected into the external opening using a large angiocath. The internal opening was then identified by bubbling. A fistula probe was inserted into the fistula tract, to the level just above the internal opening. A small incision was made overlying the tip of the probe, and silk suture was passed through to demarcate this portion of the tract. The posteromedial incision was enlarged and the space was dilated to identify the remained of the tract going to the internal opening. Once identified, a 2nd suture passer was placed through this tract. A vessel loop was then passed through this portion and secured with silk suture. The external orifice portion of the tract was divided using electrocautery. A 2nd seton was placed through this portion of the tract and secured with silk. Rectum was irrigated and aspirated and hemostasis was ensured. A piece of surgicel was placed in the anal canal and sterile dressing placed on the buttocks.  See operative report for full dictation.    Patient was placed in prone jackknife position, perineum was prepped and draped in usual sterile fashion. Juliet-anal block was performed. External visual examination revealed a small punctate wound approximately 2cm from the anal verge in the right anterolateral position. Anoscopy was performed with a medium Hill-Corral retractor that showed an internal opening in the posterior midline anal canal. Hydrogen peroxide was injected into the external opening using a large angiocath to assist with identification of the internal opening noting passage of peroxide into the posterior midline anal canal. A fistula probe was inserted into external opening and coursed through the ischiorectal fossa to the deep postanal space. A counter incision was made overlying the deep postanal space and the tip of the fistula probe was identified and a maxi vessel loop was placed through this portion of the tract and tied to itself to serve as a draining seton. From the counterincision over the deep postanal space, the fistula probe was inserted and passed through the fistula tract into the anal canal via the posterior midline internal opening. A maxi vessel loop was placed through this portion of the tract and tied to itself to serve as a draining seton. The external opening was incised and a fistulotomy was performed of the subcutaneous portion of the fistula tract. Rectum was irrigated and aspirated and hemostasis was ensured. A piece of surgicel was placed in the subcutaneous fistulotomy wound and sterile dressing placed on the buttocks.

## 2025-06-06 NOTE — ASU DISCHARGE PLAN (ADULT/PEDIATRIC) - ASU DC SPECIAL INSTRUCTIONSFT
PLEASE FOLLOW DR. CHAN'S PRINTED INSTRUCTIONS    General Discharge Instructions:  Please resume all regular home medications unless specifically advised not to take a particular medication. Also, please take any new medications as prescribed.  Please get plenty of rest, continue to ambulate several times per day, and drink adequate amounts of fluids. Avoid lifting weights greater than 5-10 lbs until you follow-up with your surgeon, who will instruct you further regarding activity restrictions.  Avoid driving or operating heavy machinery while taking pain medications.  Please follow-up with your surgeon and Primary Care Provider (PCP) as advised.    Post-operative care instructions  *You may have some intermittent pain for up to six (6) weeks post operatively. Pain does not signify any problem unless associated with fever, chills, or inability to void.  If you experience any fevers or chills please call immediately as this may be signs of an infection. You may take Tylenol (acetaminophen) 650-975mg and/or Motrin (ibuprofen) 400-600mg, both available over the counter, for pain every 6 hours as needed. Do not exceed 4000mg of Tylenol (acetaminophen) daily. You may alternate these medications such that you take one or the other every 3 hours for around the clock pain coverage.    Warning Signs:  Please call your doctor or nurse practitioner if you experience the following:  *You experience new chest pain, pressure, squeezing or tightness.  *New or worsening cough, shortness of breath, or wheeze.  *If you are vomiting and cannot keep down fluids or your medications.  *You are getting dehydrated due to continued vomiting, diarrhea, or other reasons. Signs of dehydration include dry mouth, rapid heartbeat, or feeling dizzy or faint when standing.  *You see blood or dark/black material when you vomit or have a bowel movement.  *You experience burning when you urinate, have blood in your urine, or experience a discharge.  *Your pain is not improving within 8-12 hours or is not gone within 24 hours. Call or return immediately if your pain is getting worse, changes location, or moves to your chest or back.  *You have shaking chills, or fever greater than 101.5 degrees Fahrenheit or 38 degrees Celsius.  *Any change in your symptoms, or any new symptoms that concern you.

## 2025-06-06 NOTE — ASU DISCHARGE PLAN (ADULT/PEDIATRIC) - CARE PROVIDER_API CALL
Miryam Dunne  Surgery  [facility]  [place] [address]  Phone: [phone]  Fax: [phone]  Follow Up Time: 1 month

## 2025-06-06 NOTE — ASU DISCHARGE PLAN (ADULT/PEDIATRIC) - FINANCIAL ASSISTANCE
[facility] provides services at a reduced cost to those who are determined to be eligible through [facility]’s financial assistance program. Information regarding [facility]’s financial assistance program can be found by going to [email or web address] or by calling [phone].

## 2025-06-06 NOTE — ASU DISCHARGE PLAN (ADULT/PEDIATRIC) - NS MD DC FALL RISK RISK
For information on Fall & Injury Prevention, visit: [email or web address] OR  [email or web address] OR  https://www.cdc.gov/steadi/patient.html

## 2025-08-26 ENCOUNTER — APPOINTMENT (OUTPATIENT)
Dept: COLORECTAL SURGERY | Facility: CLINIC | Age: 50
End: 2025-08-26
Payer: COMMERCIAL

## 2025-08-26 VITALS
HEIGHT: 69.5 IN | BODY MASS INDEX: 20.7 KG/M2 | TEMPERATURE: 97.7 F | SYSTOLIC BLOOD PRESSURE: 125 MMHG | WEIGHT: 143 LBS | HEART RATE: 80 BPM | DIASTOLIC BLOOD PRESSURE: 80 MMHG

## 2025-08-26 DIAGNOSIS — K60.30 ANAL FISTULA, UNSPECIFIED: ICD-10-CM

## 2025-08-26 PROCEDURE — 99024 POSTOP FOLLOW-UP VISIT: CPT

## 2025-09-05 ENCOUNTER — RESULT REVIEW (OUTPATIENT)
Age: 50
End: 2025-09-05

## 2025-09-05 ENCOUNTER — APPOINTMENT (OUTPATIENT)
Dept: COLORECTAL SURGERY | Facility: CLINIC | Age: 50
End: 2025-09-05

## 2025-09-05 ENCOUNTER — TRANSCRIPTION ENCOUNTER (OUTPATIENT)
Age: 50
End: 2025-09-05

## 2025-09-05 RX ORDER — OXYCODONE AND ACETAMINOPHEN 5; 325 MG/1; MG/1
5-325 TABLET ORAL
Qty: 18 | Refills: 0 | Status: ACTIVE | COMMUNITY
Start: 2025-09-05 | End: 1900-01-01

## 2025-09-05 RX ORDER — AMOXICILLIN AND CLAVULANATE POTASSIUM 875; 125 MG/1; MG/1
875-125 TABLET, COATED ORAL
Qty: 8 | Refills: 0 | Status: ACTIVE | COMMUNITY
Start: 2025-09-05 | End: 1900-01-01

## 2025-09-12 ENCOUNTER — NON-APPOINTMENT (OUTPATIENT)
Age: 50
End: 2025-09-12

## (undated) DEVICE — WARMING BLANKET UPPER ADULT

## (undated) DEVICE — DRAPE 1/2 SHEET 40X57"

## (undated) DEVICE — LUBRICATING JELLY ONESHOT 1.25OZ

## (undated) DEVICE — SUT VICRYL 2-0 27" UR-6

## (undated) DEVICE — VENODYNE/SCD SLEEVE CALF MEDIUM

## (undated) DEVICE — POSITIONER STRAP KNEE & BODY 3X60" DISP

## (undated) DEVICE — ELCTR BOVIE TIP BLADE INSULATED 2.8" EDGE WITH SAFETY

## (undated) DEVICE — GLV 7.5 PROTEXIS (WHITE)

## (undated) DEVICE — POSITIONER FOAM EGG CRATE ULNAR 2PCS (PINK)

## (undated) DEVICE — GLV 7 PROTEXIS (WHITE)

## (undated) DEVICE — DRSG CURITY GAUZE SPONGE 4 X 8" 12-PLY NON-STERILE

## (undated) DEVICE — DRAPE TOWEL BLUE 17" X 24"

## (undated) DEVICE — VESSEL LOOP MAXI-BLUE 0.120" X 16"

## (undated) DEVICE — TAPE SILK 3"

## (undated) DEVICE — PACK COLO RECTAL

## (undated) DEVICE — SUT SILK 0 30" SH

## (undated) DEVICE — GLV 8 PROTEXIS (WHITE)

## (undated) DEVICE — VESSEL LOOP MAXI-WHITE 0.120" X 16"

## (undated) DEVICE — SUT VICRYL PLUS 2-0 27" CT-1 UNDYED

## (undated) DEVICE — SUT VICRYL 2-0 27" SH UNDYED

## (undated) DEVICE — VESSEL LOOP ASPEN MAXI RED

## (undated) DEVICE — DRSG TELFA 3 X 8

## (undated) DEVICE — DRAIN PENROSE .25" X 18" LATEX

## (undated) DEVICE — SUT ETHILON 2-0 18" FS

## (undated) DEVICE — SUT SILK 2-0 12-18"

## (undated) DEVICE — PREP BETADINE SPONGE STICKS

## (undated) DEVICE — ELCTR PENCIL SMOKE EVACUATOR COATED PUSH BUTTON 70MM

## (undated) DEVICE — GLV 6.5 PROTEXIS (WHITE)

## (undated) DEVICE — SPECIMEN CONTAINER 4OZ

## (undated) DEVICE — SUT VICRYL 3-0 27" SH UNDYED

## (undated) DEVICE — ELCTR STRYKER NEPTUNE BLADE COATED, INSULATED 70MM

## (undated) DEVICE — SYR ASEPTO

## (undated) DEVICE — LAP PAD 18 X 18"

## (undated) DEVICE — GOWN ROYAL SILK XL

## (undated) DEVICE — NDL HYPO SAFE 22G X 1.5" (BLACK)